# Patient Record
Sex: FEMALE | Race: BLACK OR AFRICAN AMERICAN | NOT HISPANIC OR LATINO | Employment: FULL TIME | URBAN - METROPOLITAN AREA
[De-identification: names, ages, dates, MRNs, and addresses within clinical notes are randomized per-mention and may not be internally consistent; named-entity substitution may affect disease eponyms.]

---

## 2022-09-21 ENCOUNTER — TELEPHONE (OUTPATIENT)
Dept: INTERNAL MEDICINE | Facility: CLINIC | Age: 32
End: 2022-09-21

## 2022-09-21 ENCOUNTER — LAB (OUTPATIENT)
Dept: LAB | Facility: HOSPITAL | Age: 32
End: 2022-09-21

## 2022-09-21 ENCOUNTER — PATIENT ROUNDING (BHMG ONLY) (OUTPATIENT)
Dept: INTERNAL MEDICINE | Facility: CLINIC | Age: 32
End: 2022-09-21

## 2022-09-21 ENCOUNTER — OFFICE VISIT (OUTPATIENT)
Dept: INTERNAL MEDICINE | Facility: CLINIC | Age: 32
End: 2022-09-21

## 2022-09-21 VITALS
BODY MASS INDEX: 23.73 KG/M2 | HEIGHT: 64 IN | SYSTOLIC BLOOD PRESSURE: 110 MMHG | DIASTOLIC BLOOD PRESSURE: 70 MMHG | OXYGEN SATURATION: 99 % | TEMPERATURE: 97.4 F | WEIGHT: 139 LBS | HEART RATE: 87 BPM

## 2022-09-21 DIAGNOSIS — F32.1 CURRENT MODERATE EPISODE OF MAJOR DEPRESSIVE DISORDER WITHOUT PRIOR EPISODE: Primary | ICD-10-CM

## 2022-09-21 PROCEDURE — 99203 OFFICE O/P NEW LOW 30 MIN: CPT | Performed by: NURSE PRACTITIONER

## 2022-09-21 PROCEDURE — 85025 COMPLETE CBC W/AUTO DIFF WBC: CPT | Performed by: NURSE PRACTITIONER

## 2022-09-21 PROCEDURE — 82306 VITAMIN D 25 HYDROXY: CPT | Performed by: NURSE PRACTITIONER

## 2022-09-21 PROCEDURE — 84443 ASSAY THYROID STIM HORMONE: CPT | Performed by: NURSE PRACTITIONER

## 2022-09-21 PROCEDURE — 36415 COLL VENOUS BLD VENIPUNCTURE: CPT | Performed by: NURSE PRACTITIONER

## 2022-09-21 PROCEDURE — 80053 COMPREHEN METABOLIC PANEL: CPT | Performed by: NURSE PRACTITIONER

## 2022-09-21 NOTE — TELEPHONE ENCOUNTER
Pharmacy Name:  [id number] - [place], [place] - [address]  [place] [address] [facility] [place] [facility] [place] [phone]  - [phone]   [phone]    Pharmacy representative name: MARGY    Pharmacy representative phone number: [phone]  What medication are you calling in regards to:    sertraline (Zoloft) 50 MG tablet     What question does the pharmacy have: PHARMACY CALLED TO VERIFY INSTRUCTIONS ON PRESCRIPTION    Who is the provider that prescribed the medication: CHRIS    Additional notes: NA

## 2022-09-21 NOTE — TELEPHONE ENCOUNTER
The instructions should have said to take 1/2 tablet PO daily x7 days and then increase to 1 tablet daily

## 2022-09-21 NOTE — PROGRESS NOTES
A  my chart message has been sent to the patient for patient rounding with [facility] [place], [place].

## 2022-09-21 NOTE — PROGRESS NOTES
Subjective   Chief Complaint   Patient presents with   • Establish Care       Nissa Vega is a 32 y.o. female here today as a new patient to establish care.  Pt has not had a regular PCP.  Pt feels like she may have some seasonal depression.  She states this started a couple years ago.  She has never been treated for it.  She states that usually she can snap herself out of it but she hasn't been able to this time.  Staying in bed more than usual.  She hasn't been working out for a couple weeks and that's not like her. She does have some anxiety as well.  She is a nurse.  She can't recall anything that has triggered the depression.  She lives with her boyfriend.  Her mother is bipolar.      Review of Systems   Constitutional: Negative for activity change, appetite change and fatigue.   HENT: Negative for congestion.    Respiratory: Negative for cough and shortness of breath.    Cardiovascular: Negative for chest pain and leg swelling.   Gastrointestinal: Negative for abdominal pain.   Neurological: Negative for dizziness, weakness and confusion.   Psychiatric/Behavioral: Positive for depressed mood and stress. Negative for behavioral problems, decreased concentration and suicidal ideas. The patient is nervous/anxious.        Past Medical History:   Diagnosis Date   • Anxiety    • Depression      History reviewed. No pertinent surgical history.  Family History   Problem Relation Age of Onset   • Mental illness Mother    • Hypertension Mother    • Obesity Mother    • Obesity Father    • Hypertension Father      Social History     Tobacco Use   Smoking Status Never Smoker   Smokeless Tobacco Current User      Social History     Substance and Sexual Activity   Alcohol Use Yes    Comment: social      Current Outpatient Medications on File Prior to Visit   Medication Sig   • acyclovir (ZOVIRAX) 400 MG tablet    • ALEXANDREA 0.35 MG tablet    • [DISCONTINUED] cyclobenzaprine (FLEXERIL) 5 MG tablet Take 1 tablet by mouth 3  "(Three) Times a Day As Needed for Muscle Spasms.     No current facility-administered medications on file prior to visit.     No Known Allergies    Objective   Vitals:    09/21/22 1009   BP: 110/70   BP Location: Left arm   Patient Position: Sitting   Pulse: 87   Temp: 97.4 °F (36.3 °C)   SpO2: 99%   Weight: 63 kg (139 lb)   Height: 162.6 cm (64\")     Body mass index is 23.86 kg/m².    Physical Exam  Vitals and nursing note reviewed.   HENT:      Head: Normocephalic.   Eyes:      Pupils: Pupils are equal, round, and reactive to light.   Cardiovascular:      Rate and Rhythm: Normal rate and regular rhythm.      Pulses: Normal pulses.      Heart sounds: Normal heart sounds.   Pulmonary:      Effort: Pulmonary effort is normal.      Breath sounds: Normal breath sounds.   Skin:     General: Skin is warm and dry.      Capillary Refill: Capillary refill takes less than 2 seconds.   Neurological:      General: No focal deficit present.      Mental Status: She is alert and oriented to person, place, and time.      Gait: Gait is intact.   Psychiatric:         Attention and Perception: Attention normal.         Mood and Affect: Mood normal.         Behavior: Behavior normal.         Thought Content: Thought content normal.         Judgment: Judgment normal.         Assessment & Plan   Problem List Items Addressed This Visit    None     Visit Diagnoses     Current moderate episode of major depressive disorder without prior episode (HCC)    -  Primary    Relevant Medications    sertraline (Zoloft) 50 MG tablet    Other Relevant Orders    CBC w AUTO Differential    Comprehensive Metabolic Panel    TSH Rfx On Abnormal To Free T4    Vitamin D 25 hydroxy         Start Zoloft  Discussed with pt could take 4-6 weeks to get full affect of medication  Instructed if depression worsens stop med and call office  Labs today    Current Outpatient Medications:   •  acyclovir (ZOVIRAX) 400 MG tablet, , Disp: , Rfl:   •  ALEXANDREA 0.35 MG " tablet, , Disp: , Rfl:   •  sertraline (Zoloft) 50 MG tablet, Take 1 PO daily x7 days and then increase to 1 tablet, Disp: 30 tablet, Rfl: 1       Plan of care reviewed with the patient at the conclusion of today's visit.  Education was provided regarding diagnosis, management, and any prescribed or recommended OTC medications.  Patient verbalized understanding of and agreement with management plan.     Return in about 4 weeks (around 10/19/2022) for Recheck Depression.      Ranjith Escalante, APRN

## 2022-09-22 DIAGNOSIS — F32.1 CURRENT MODERATE EPISODE OF MAJOR DEPRESSIVE DISORDER WITHOUT PRIOR EPISODE: ICD-10-CM

## 2022-09-22 LAB
25(OH)D3 SERPL-MCNC: 47.1 NG/ML (ref 30–100)
ALBUMIN SERPL-MCNC: 4.4 G/DL (ref 3.5–5.2)
ALBUMIN/GLOB SERPL: 1.8 G/DL
ALP SERPL-CCNC: 36 U/L (ref 39–117)
ALT SERPL W P-5'-P-CCNC: 12 U/L (ref 1–33)
ANION GAP SERPL CALCULATED.3IONS-SCNC: 10.4 MMOL/L (ref 5–15)
AST SERPL-CCNC: 17 U/L (ref 1–32)
BASOPHILS # BLD AUTO: 0.06 10*3/MM3 (ref 0–0.2)
BASOPHILS NFR BLD AUTO: 0.9 % (ref 0–1.5)
BILIRUB SERPL-MCNC: 0.3 MG/DL (ref 0–1.2)
BUN SERPL-MCNC: 11 MG/DL (ref 6–20)
BUN/CREAT SERPL: 10.8 (ref 7–25)
CALCIUM SPEC-SCNC: 9.1 MG/DL (ref 8.6–10.5)
CHLORIDE SERPL-SCNC: 107 MMOL/L (ref 98–107)
CO2 SERPL-SCNC: 23.6 MMOL/L (ref 22–29)
CREAT SERPL-MCNC: 1.02 MG/DL (ref 0.57–1)
DEPRECATED RDW RBC AUTO: 41.2 FL (ref 37–54)
EGFRCR SERPLBLD CKD-EPI 2021: 76.1 ML/MIN/1.73
EOSINOPHIL # BLD AUTO: 0.31 10*3/MM3 (ref 0–0.4)
EOSINOPHIL NFR BLD AUTO: 4.7 % (ref 0.3–6.2)
ERYTHROCYTE [DISTWIDTH] IN BLOOD BY AUTOMATED COUNT: 13.1 % (ref 12.3–15.4)
GLOBULIN UR ELPH-MCNC: 2.5 GM/DL
GLUCOSE SERPL-MCNC: 84 MG/DL (ref 65–99)
HCT VFR BLD AUTO: 38.9 % (ref 34–46.6)
HGB BLD-MCNC: 12.6 G/DL (ref 12–15.9)
IMM GRANULOCYTES # BLD AUTO: 0.01 10*3/MM3 (ref 0–0.05)
IMM GRANULOCYTES NFR BLD AUTO: 0.2 % (ref 0–0.5)
LYMPHOCYTES # BLD AUTO: 2.1 10*3/MM3 (ref 0.7–3.1)
LYMPHOCYTES NFR BLD AUTO: 31.5 % (ref 19.6–45.3)
MCH RBC QN AUTO: 28.1 PG (ref 26.6–33)
MCHC RBC AUTO-ENTMCNC: 32.4 G/DL (ref 31.5–35.7)
MCV RBC AUTO: 86.6 FL (ref 79–97)
MONOCYTES # BLD AUTO: 0.41 10*3/MM3 (ref 0.1–0.9)
MONOCYTES NFR BLD AUTO: 6.2 % (ref 5–12)
NEUTROPHILS NFR BLD AUTO: 3.77 10*3/MM3 (ref 1.7–7)
NEUTROPHILS NFR BLD AUTO: 56.5 % (ref 42.7–76)
NRBC BLD AUTO-RTO: 0 /100 WBC (ref 0–0.2)
PLATELET # BLD AUTO: 214 10*3/MM3 (ref 140–450)
PMV BLD AUTO: 11.2 FL (ref 6–12)
POTASSIUM SERPL-SCNC: 4.4 MMOL/L (ref 3.5–5.2)
PROT SERPL-MCNC: 6.9 G/DL (ref 6–8.5)
RBC # BLD AUTO: 4.49 10*6/MM3 (ref 3.77–5.28)
SODIUM SERPL-SCNC: 141 MMOL/L (ref 136–145)
TSH SERPL DL<=0.05 MIU/L-ACNC: 1.06 UIU/ML (ref 0.27–4.2)
WBC NRBC COR # BLD: 6.66 10*3/MM3 (ref 3.4–10.8)

## 2022-09-22 NOTE — TELEPHONE ENCOUNTER
Called Pharmacy 5 times could never get through to talk to someone. I resent the prescription with the new  Direction's.

## 2022-09-22 NOTE — TELEPHONE ENCOUNTER
I have tried 3 x to contact pharmacy, cant not get through to talk to someone just on hold. Will try again today.

## 2022-11-01 DIAGNOSIS — F32.1 CURRENT MODERATE EPISODE OF MAJOR DEPRESSIVE DISORDER WITHOUT PRIOR EPISODE: ICD-10-CM

## 2022-11-30 ENCOUNTER — OFFICE VISIT (OUTPATIENT)
Dept: INTERNAL MEDICINE | Facility: CLINIC | Age: 32
End: 2022-11-30

## 2022-11-30 VITALS
HEART RATE: 87 BPM | BODY MASS INDEX: 23.73 KG/M2 | DIASTOLIC BLOOD PRESSURE: 70 MMHG | HEIGHT: 64 IN | WEIGHT: 139 LBS | SYSTOLIC BLOOD PRESSURE: 110 MMHG | OXYGEN SATURATION: 99 % | TEMPERATURE: 97.5 F

## 2022-11-30 DIAGNOSIS — Z00.00 ROUTINE PHYSICAL EXAMINATION: Primary | ICD-10-CM

## 2022-11-30 DIAGNOSIS — F33.42 RECURRENT MAJOR DEPRESSIVE DISORDER, IN FULL REMISSION: ICD-10-CM

## 2022-11-30 DIAGNOSIS — E01.0 THYROMEGALY: ICD-10-CM

## 2022-11-30 PROCEDURE — 99395 PREV VISIT EST AGE 18-39: CPT | Performed by: NURSE PRACTITIONER

## 2022-11-30 NOTE — PROGRESS NOTES
Subjective   Chief Complaint   Patient presents with   • Annual Exam       Nissa Vega is a 32 y.o. female here today for annual exam.    Overall healthy: Yes  Regular exercise:  Yes  Diet is well balanced:  No  Vitamin Supplement:  No  Alcohol intake:  Socially only   Tobacco use:  No    Cardiovascular risk is low:  Yes   Menstrual cycle regular:  Yes  PAP:  Done in Oct  Concern for STDs:  No  Mammogram:  N/A  Last colon screening:  N/A  Regular dental exam:  No  Regular eye exam:  No, wears glasses  Immunizations up to date:  Yes  Wear a seatbelt regularly:  Yes  Wear sunscreen regularly when outdoors:  No    Review of Systems   Constitutional: Negative for activity change, appetite change and fatigue.   HENT: Negative for congestion.    Respiratory: Negative for cough and shortness of breath.    Cardiovascular: Negative for chest pain and leg swelling.   Gastrointestinal: Negative for abdominal pain.   Neurological: Negative for dizziness, weakness and confusion.   Psychiatric/Behavioral: Negative for behavioral problems and decreased concentration.       The following portions of the patient's history were reviewed and updated as appropriate: allergies, current medications, past family history, past medical history, past social history, past surgical history and problem list.    Past Medical History:   Diagnosis Date   • Anxiety    • Depression      History reviewed. No pertinent surgical history.  Family History   Problem Relation Age of Onset   • Mental illness Mother    • Hypertension Mother    • Obesity Mother    • Obesity Father    • Hypertension Father      Social History     Tobacco Use   Smoking Status Never   Smokeless Tobacco Current      Social History     Substance and Sexual Activity   Alcohol Use Yes    Comment: social      No Known Allergies    Current Outpatient Medications on File Prior to Visit   Medication Sig   • acyclovir (ZOVIRAX) 400 MG tablet    • ALEXANDREA 0.35 MG tablet    •  "[DISCONTINUED] sertraline (ZOLOFT) 50 MG tablet TAKE 1/2 TABLET BY MOUTH DAILY FOR 7 DAYS., THEN INCREASE TO 1 TABLET EVERY DAY     No current facility-administered medications on file prior to visit.       Objective   Vitals:    11/30/22 1358   BP: 110/70   BP Location: Left arm   Patient Position: Sitting   Pulse: 87   Temp: 97.5 °F (36.4 °C)   SpO2: 99%   Weight: 63 kg (139 lb)   Height: 162.6 cm (64\")     Body mass index is 23.86 kg/m².    Physical Exam  Vitals and nursing note reviewed.   Constitutional:       Appearance: Normal appearance. She is normal weight.   HENT:      Head: Normocephalic.      Right Ear: Tympanic membrane, ear canal and external ear normal.      Left Ear: Tympanic membrane, ear canal and external ear normal.      Mouth/Throat:      Mouth: Mucous membranes are moist.      Pharynx: Oropharynx is clear.      Tonsils: 2+ on the right. 2+ on the left.      Comments: 2 tonsil stones noted L tonsil  Eyes:      Extraocular Movements: Extraocular movements intact.      Conjunctiva/sclera: Conjunctivae normal.      Pupils: Pupils are equal, round, and reactive to light.      Comments: Wearing glasses   Neck:      Thyroid: Thyromegaly present. No thyroid tenderness.      Vascular: No carotid bruit.   Cardiovascular:      Rate and Rhythm: Normal rate and regular rhythm.      Pulses: Normal pulses.           Carotid pulses are 2+ on the right side and 2+ on the left side.     Heart sounds: Normal heart sounds. No murmur heard.  Pulmonary:      Effort: Pulmonary effort is normal.      Breath sounds: Normal breath sounds.   Abdominal:      General: Bowel sounds are normal. There is no distension.      Palpations: Abdomen is soft.      Tenderness: There is no abdominal tenderness.   Musculoskeletal:      Cervical back: Normal.      Thoracic back: Normal.      Lumbar back: Normal.      Right lower leg: No edema.      Left lower leg: No edema.      Comments: Full ROM of major joints   Lymphadenopathy:     "  Cervical: No cervical adenopathy.   Skin:     General: Skin is warm and dry.      Capillary Refill: Capillary refill takes less than 2 seconds.   Neurological:      General: No focal deficit present.      Mental Status: She is alert and oriented to person, place, and time.      GCS: GCS eye subscore is 4. GCS verbal subscore is 5. GCS motor subscore is 6.      Motor: Motor function is intact.      Gait: Gait is intact.   Psychiatric:         Attention and Perception: Attention normal.         Mood and Affect: Mood normal.         Behavior: Behavior normal.         Cognition and Memory: Cognition and memory normal.         Judgment: Judgment normal.         BMI is within normal parameters. No other follow-up for BMI required.     Assessment & Plan     Current Outpatient Medications:   •  acyclovir (ZOVIRAX) 400 MG tablet, , Disp: , Rfl:   •  ALEXANDREA 0.35 MG tablet, , Disp: , Rfl:   •  sertraline (ZOLOFT) 50 MG tablet, TAKE 1 TABLET EVERY DAY, Disp: 30 tablet, Rfl: 3    Problem List Items Addressed This Visit    None  Visit Diagnoses     Routine physical examination    -  Primary    Thyromegaly        Relevant Orders    US Thyroid    Recurrent major depressive disorder, in full remission (HCC)        Relevant Medications    sertraline (ZOLOFT) 50 MG tablet        Depression much improved with Zoloft, cont at current dose  Labs UTD  Schedule thyroid U/S  Start breast ca screen at 47  Start colon ca screen at 47  Recommend daily MVI         Counseling was given to patient for the following topics: appropriate exercise, healthy eating habits, disease prevention, importance of self breast exam and breast health and sun safety.      Plan of care reviewed with the patient at the conclusion of today's visit.  Education was provided regarding diagnosis, management, and any prescribed or recommended OTC medications.  Patient verbalized understanding of and agreement with management plan.     Return in about 4 months (around  3/30/2023) for Recheck Depression.      Ranjith Apple, APRN

## 2022-12-12 ENCOUNTER — HOSPITAL ENCOUNTER (OUTPATIENT)
Dept: ULTRASOUND IMAGING | Facility: HOSPITAL | Age: 32
Discharge: HOME | End: 2022-12-12
Admitting: NURSE PRACTITIONER

## 2022-12-12 DIAGNOSIS — E01.0 THYROMEGALY: ICD-10-CM

## 2022-12-12 PROCEDURE — 76536 US EXAM OF HEAD AND NECK: CPT

## 2024-08-12 ENCOUNTER — OFFICE VISIT (OUTPATIENT)
Dept: INTERNAL MEDICINE | Facility: CLINIC | Age: 34
End: 2024-08-12
Payer: COMMERCIAL

## 2024-08-12 VITALS
HEART RATE: 78 BPM | BODY MASS INDEX: 24.72 KG/M2 | DIASTOLIC BLOOD PRESSURE: 78 MMHG | WEIGHT: 144.8 LBS | HEIGHT: 64 IN | TEMPERATURE: 97.2 F | OXYGEN SATURATION: 99 % | SYSTOLIC BLOOD PRESSURE: 120 MMHG

## 2024-08-12 DIAGNOSIS — Z00.00 ROUTINE PHYSICAL EXAMINATION: Primary | ICD-10-CM

## 2024-08-12 PROCEDURE — 99395 PREV VISIT EST AGE 18-39: CPT | Performed by: NURSE PRACTITIONER

## 2024-08-12 NOTE — PROGRESS NOTES
"Chief Complaint   Patient presents with    Annual Exam       HPI  Nissa Vega is a 34 y.o. 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    Subjective  Review of Systems   Constitutional:  Negative for activity change, appetite change and fatigue.   HENT:  Negative for congestion.    Respiratory:  Negative for cough and shortness of breath.    Cardiovascular:  Negative for chest pain and leg swelling.   Gastrointestinal:  Negative for abdominal pain.   Neurological:  Negative for dizziness, weakness and confusion.   Psychiatric/Behavioral:  Negative for behavioral problems and decreased concentration.        Objective  Visit Vitals  /78 (BP Location: Left arm, Patient Position: Sitting)   Pulse 78   Temp 97.2 °F (36.2 °C)   Ht 162.6 cm (64\")   Wt 65.7 kg (144 lb 12.8 oz)   SpO2 99%   BMI 24.85 kg/m²        Physical Exam  Vitals and nursing note reviewed.   HENT:      Head: Normocephalic.      Right Ear: A middle ear effusion is present. Tympanic membrane is not erythematous.      Left Ear: A middle ear effusion is present. Tympanic membrane is not erythematous.      Mouth/Throat:      Mouth: Mucous membranes are moist.      Pharynx: Oropharynx is clear. Posterior oropharyngeal erythema present. No oropharyngeal exudate.   Eyes:      Extraocular Movements: Extraocular movements intact.      Pupils: Pupils are equal, round, and reactive to light.   Neck:      Thyroid: No thyromegaly.   Cardiovascular:      Rate and Rhythm: Normal rate and regular rhythm.      Pulses: Normal pulses.      Heart sounds: Normal heart sounds.   Pulmonary:      Effort: Pulmonary effort is normal. No respiratory distress.      Breath sounds: Normal breath sounds. "   Abdominal:      General: Bowel sounds are normal.   Musculoskeletal:      Comments: Full ROM of major joints   Lymphadenopathy:      Cervical: No cervical adenopathy.   Skin:     General: Skin is warm and dry.      Capillary Refill: Capillary refill takes less than 2 seconds.   Neurological:      General: No focal deficit present.      Mental Status: She is alert and oriented to person, place, and time.      GCS: GCS eye subscore is 4. GCS verbal subscore is 5. GCS motor subscore is 6.      Gait: Gait is intact.   Psychiatric:         Attention and Perception: Attention normal.         Mood and Affect: Mood normal.         Behavior: Behavior normal.          Procedures     Assessment and Plan  Diagnoses and all orders for this visit:    1. Routine physical examination (Primary)    School forms completed  Start breast ca screen at age 42  Start colon ca screen at 47  Has pap later this yea  Recent eye exam, has glasses to     Return in about 1 year (around 8/12/2025) for Annual physical.        VESTA Hameed